# Patient Record
Sex: FEMALE | Race: BLACK OR AFRICAN AMERICAN | NOT HISPANIC OR LATINO | Employment: FULL TIME | URBAN - METROPOLITAN AREA
[De-identification: names, ages, dates, MRNs, and addresses within clinical notes are randomized per-mention and may not be internally consistent; named-entity substitution may affect disease eponyms.]

---

## 2023-02-22 ENCOUNTER — APPOINTMENT (OUTPATIENT)
Dept: ULTRASOUND IMAGING | Facility: HOSPITAL | Age: 23
End: 2023-02-22
Payer: COMMERCIAL

## 2023-02-22 ENCOUNTER — HOSPITAL ENCOUNTER (EMERGENCY)
Facility: HOSPITAL | Age: 23
Discharge: HOME | End: 2023-02-22
Attending: EMERGENCY MEDICINE | Admitting: EMERGENCY MEDICINE
Payer: COMMERCIAL

## 2023-02-22 VITALS
RESPIRATION RATE: 14 BRPM | DIASTOLIC BLOOD PRESSURE: 74 MMHG | TEMPERATURE: 97.3 F | HEART RATE: 74 BPM | SYSTOLIC BLOOD PRESSURE: 106 MMHG | OXYGEN SATURATION: 97 %

## 2023-02-22 DIAGNOSIS — O03.9 MISCARRIAGE: Primary | ICD-10-CM

## 2023-02-22 LAB
ABO GROUP BLD: NORMAL
ALBUMIN SERPL-MCNC: 4.3 G/DL (ref 3.5–5.2)
ALBUMIN/GLOB SERPL: 1.4 G/DL
ALP SERPL-CCNC: 38 U/L (ref 39–117)
ALT SERPL W P-5'-P-CCNC: 11 U/L (ref 1–33)
ANION GAP SERPL CALCULATED.3IONS-SCNC: 5.4 MMOL/L (ref 5–15)
AST SERPL-CCNC: 14 U/L (ref 1–32)
BASOPHILS # BLD AUTO: 0.07 10*3/MM3 (ref 0–0.2)
BASOPHILS NFR BLD AUTO: 1.4 % (ref 0–1.5)
BILIRUB SERPL-MCNC: 0.2 MG/DL (ref 0–1.2)
BLD GP AB SCN SERPL QL: NEGATIVE
BUN SERPL-MCNC: 7 MG/DL (ref 6–20)
BUN/CREAT SERPL: 9.9 (ref 7–25)
CALCIUM SPEC-SCNC: 9.1 MG/DL (ref 8.6–10.5)
CHLORIDE SERPL-SCNC: 106 MMOL/L (ref 98–107)
CO2 SERPL-SCNC: 26.6 MMOL/L (ref 22–29)
CREAT SERPL-MCNC: 0.71 MG/DL (ref 0.57–1)
DEPRECATED RDW RBC AUTO: 44.9 FL (ref 37–54)
EGFRCR SERPLBLD CKD-EPI 2021: 123.5 ML/MIN/1.73
EOSINOPHIL # BLD AUTO: 0.1 10*3/MM3 (ref 0–0.4)
EOSINOPHIL NFR BLD AUTO: 1.9 % (ref 0.3–6.2)
ERYTHROCYTE [DISTWIDTH] IN BLOOD BY AUTOMATED COUNT: 14 % (ref 12.3–15.4)
GLOBULIN UR ELPH-MCNC: 3 GM/DL
GLUCOSE SERPL-MCNC: 81 MG/DL (ref 65–99)
HCG INTACT+B SERPL-ACNC: 7.31 MIU/ML
HCT VFR BLD AUTO: 42.4 % (ref 34–46.6)
HGB BLD-MCNC: 13.3 G/DL (ref 12–15.9)
IMM GRANULOCYTES # BLD AUTO: 0.01 10*3/MM3 (ref 0–0.05)
IMM GRANULOCYTES NFR BLD AUTO: 0.2 % (ref 0–0.5)
LYMPHOCYTES # BLD AUTO: 2.29 10*3/MM3 (ref 0.7–3.1)
LYMPHOCYTES NFR BLD AUTO: 44.2 % (ref 19.6–45.3)
MCH RBC QN AUTO: 27.1 PG (ref 26.6–33)
MCHC RBC AUTO-ENTMCNC: 31.4 G/DL (ref 31.5–35.7)
MCV RBC AUTO: 86.5 FL (ref 79–97)
MONOCYTES # BLD AUTO: 0.37 10*3/MM3 (ref 0.1–0.9)
MONOCYTES NFR BLD AUTO: 7.1 % (ref 5–12)
NEUTROPHILS NFR BLD AUTO: 2.34 10*3/MM3 (ref 1.7–7)
NEUTROPHILS NFR BLD AUTO: 45.2 % (ref 42.7–76)
NRBC BLD AUTO-RTO: 0 /100 WBC (ref 0–0.2)
PLATELET # BLD AUTO: 366 10*3/MM3 (ref 140–450)
PMV BLD AUTO: 9.9 FL (ref 6–12)
POTASSIUM SERPL-SCNC: 4.6 MMOL/L (ref 3.5–5.2)
PROT SERPL-MCNC: 7.3 G/DL (ref 6–8.5)
RBC # BLD AUTO: 4.9 10*6/MM3 (ref 3.77–5.28)
RH BLD: POSITIVE
SODIUM SERPL-SCNC: 138 MMOL/L (ref 136–145)
T&S EXPIRATION DATE: NORMAL
WBC NRBC COR # BLD: 5.18 10*3/MM3 (ref 3.4–10.8)

## 2023-02-22 PROCEDURE — 86901 BLOOD TYPING SEROLOGIC RH(D): CPT | Performed by: EMERGENCY MEDICINE

## 2023-02-22 PROCEDURE — 80053 COMPREHEN METABOLIC PANEL: CPT | Performed by: EMERGENCY MEDICINE

## 2023-02-22 PROCEDURE — 36415 COLL VENOUS BLD VENIPUNCTURE: CPT

## 2023-02-22 PROCEDURE — 85025 COMPLETE CBC W/AUTO DIFF WBC: CPT | Performed by: EMERGENCY MEDICINE

## 2023-02-22 PROCEDURE — 76815 OB US LIMITED FETUS(S): CPT

## 2023-02-22 PROCEDURE — 93976 VASCULAR STUDY: CPT

## 2023-02-22 PROCEDURE — 76817 TRANSVAGINAL US OBSTETRIC: CPT

## 2023-02-22 PROCEDURE — 86850 RBC ANTIBODY SCREEN: CPT | Performed by: EMERGENCY MEDICINE

## 2023-02-22 PROCEDURE — 86900 BLOOD TYPING SEROLOGIC ABO: CPT | Performed by: EMERGENCY MEDICINE

## 2023-02-22 PROCEDURE — 99283 EMERGENCY DEPT VISIT LOW MDM: CPT

## 2023-02-22 PROCEDURE — 84702 CHORIONIC GONADOTROPIN TEST: CPT | Performed by: EMERGENCY MEDICINE

## 2023-02-22 RX ORDER — SODIUM CHLORIDE 0.9 % (FLUSH) 0.9 %
10 SYRINGE (ML) INJECTION AS NEEDED
Status: DISCONTINUED | OUTPATIENT
Start: 2023-02-22 | End: 2023-02-22 | Stop reason: HOSPADM

## 2023-02-22 NOTE — ED TRIAGE NOTES
Patient reports abd cramping and bleeding states she is about 5 weeks preg    Patient wearing mask this RN in PPE

## 2023-02-22 NOTE — ED PROVIDER NOTES
" EMERGENCY DEPARTMENT ENCOUNTER    Room Number:  10/10  Date seen:  2/22/2023  PCP: Provider, No Known  Historian: Patient      HPI:  Chief Complaint: Pregnancy concerns  A complete HPI/ROS/PMH/PSH/SH/FH are unobtainable due to:   Context: Becki Rosenthal is a 23 y.o. female who presents to the ED c/o possible miscarriage.  Patient states that she is pregnant and somewhere between 5 and 8 weeks pregnant.  She states that she had an obstetrical ultrasound on 2/10 that did show either fetal heartbeat or decidual sac.  She states her last menstrual period was 17 January.  She has been having left-sided abdominal cramping ongoing for several weeks.  The cramping worsened over the last couple of days and she is worried about a \"miscarriage\".  Cramping is currently mild.  It is sharp in the left lower abdomen.  She denies vaginal bleeding.      MEDICAL RECORD REVIEW (non ED)  I did review prior medical records but was unable to access recent visit at [facility] despite query through care everywhere.  I do see visit in August for routine general medical examination which is unremarkable.    PAST MEDICAL HISTORY  Active Ambulatory Problems     Diagnosis Date Noted   • No Active Ambulatory Problems     Resolved Ambulatory Problems     Diagnosis Date Noted   • No Resolved Ambulatory Problems     No Additional Past Medical History         PAST SURGICAL HISTORY  History reviewed. No pertinent surgical history.      FAMILY HISTORY  History reviewed. No pertinent family history.      SOCIAL HISTORY  Social History     Socioeconomic History   • Marital status: Single         ALLERGIES  Patient has no known allergies.        REVIEW OF SYSTEMS  Review of Systems   Constitutional: Negative for fever.   Respiratory: Negative for shortness of breath.    Cardiovascular: Negative for chest pain.   Genitourinary: Positive for pelvic pain.            PHYSICAL EXAM  ED Triage Vitals   Temp Heart Rate Resp BP SpO2   02/22/23 1112 02/22/23 1112 " 02/22/23 1121 -- 02/22/23 1112   97.3 °F (36.3 °C) 80 16  99 %      Temp src Heart Rate Source Patient Position BP Location FiO2 (%)   02/22/23 1112 02/22/23 1112 -- -- --   Tympanic Monitor          Physical Exam    GENERAL: Alert female no obvious distress.  Triage vitals reviewed and are benign  HENT: nares patent  EYES: no scleral icterus  CV: regular rhythm, regular rate  RESPIRATORY: normal effort, clear to auscultation bilateral  ABDOMEN: soft, nontender to palpation  MUSCULOSKELETAL: no deformity  NEURO: Strength sensation and coordination are grossly intact.  Speech and mentation are unremarkable  SKIN: warm, dry      Vital signs and nursing notes reviewed.          LAB RESULTS  Recent Results (from the past 24 hour(s))   hCG, Quantitative, Pregnancy    Collection Time: 02/22/23 12:52 PM    Specimen: Blood   Result Value Ref Range    HCG Quantitative 7.31 mIU/mL   Comprehensive Metabolic Panel    Collection Time: 02/22/23 12:53 PM    Specimen: Blood   Result Value Ref Range    Glucose 81 65 - 99 mg/dL    BUN 7 6 - 20 mg/dL    Creatinine 0.71 0.57 - 1.00 mg/dL    Sodium 138 136 - 145 mmol/L    Potassium 4.6 3.5 - 5.2 mmol/L    Chloride 106 98 - 107 mmol/L    CO2 26.6 22.0 - 29.0 mmol/L    Calcium 9.1 8.6 - 10.5 mg/dL    Total Protein 7.3 6.0 - 8.5 g/dL    Albumin 4.3 3.5 - 5.2 g/dL    ALT (SGPT) 11 1 - 33 U/L    AST (SGOT) 14 1 - 32 U/L    Alkaline Phosphatase 38 (L) 39 - 117 U/L    Total Bilirubin 0.2 0.0 - 1.2 mg/dL    Globulin 3.0 gm/dL    A/G Ratio 1.4 g/dL    BUN/Creatinine Ratio 9.9 7.0 - 25.0    Anion Gap 5.4 5.0 - 15.0 mmol/L    eGFR 123.5 >60.0 mL/min/1.73   CBC Auto Differential    Collection Time: 02/22/23 12:53 PM    Specimen: Blood   Result Value Ref Range    WBC 5.18 3.40 - 10.80 10*3/mm3    RBC 4.90 3.77 - 5.28 10*6/mm3    Hemoglobin 13.3 12.0 - 15.9 g/dL    Hematocrit 42.4 34.0 - 46.6 %    MCV 86.5 79.0 - 97.0 fL    MCH 27.1 26.6 - 33.0 pg    MCHC 31.4 (L) 31.5 - 35.7 g/dL    RDW 14.0 12.3 -  15.4 %    RDW-SD 44.9 37.0 - 54.0 fl    MPV 9.9 6.0 - 12.0 fL    Platelets 366 140 - 450 10*3/mm3    Neutrophil % 45.2 42.7 - 76.0 %    Lymphocyte % 44.2 19.6 - 45.3 %    Monocyte % 7.1 5.0 - 12.0 %    Eosinophil % 1.9 0.3 - 6.2 %    Basophil % 1.4 0.0 - 1.5 %    Immature Grans % 0.2 0.0 - 0.5 %    Neutrophils, Absolute 2.34 1.70 - 7.00 10*3/mm3    Lymphocytes, Absolute 2.29 0.70 - 3.10 10*3/mm3    Monocytes, Absolute 0.37 0.10 - 0.90 10*3/mm3    Eosinophils, Absolute 0.10 0.00 - 0.40 10*3/mm3    Basophils, Absolute 0.07 0.00 - 0.20 10*3/mm3    Immature Grans, Absolute 0.01 0.00 - 0.05 10*3/mm3    nRBC 0.0 0.0 - 0.2 /100 WBC   Type & Screen    Collection Time: 02/22/23 12:54 PM    Specimen: Blood   Result Value Ref Range    ABO Type A     RH type Positive     Antibody Screen Negative     T&S Expiration Date 2/25/2023 11:59:59 PM        Ordered the above labs and independently interpreted results. My findings will be discussed in the medical decision making section below        RADIOLOGY  US Ob Limited 1 + Fetuses, US Ob Transvaginal, US Testicular or Ovarian Vascular Limited    Result Date: 2/22/2023  PROCEDURE: US OB LIMITED 1 + FETUSES-, US TESTICULAR OR OVARIAN VASCULAR LIMITED-, US OB TRANSVAGINAL-  HISTORY: Left cramping, bleeding for 3 days. Quantitative beta-hCG 7.3.  TECHNIQUE: Transabdominal and transvaginal ultrasound of the pelvis was performed.  COMPARISON: None available.  FINDINGS:  MEASUREMENTS: Uterus: 8.0 x 4.5 x 6.4 cm Endometrial stripe: 0.6 cm Right ovary: 2.9 x 2.0 x 1.5 cm Left ovary: 2.8 x 1.2 x 1.6 cm  UTERUS: The uterus is normal in size for patient age. The endometrial thickness is within normal limits for patient age. No intrauterine gestational sac is visualized. OVARIES: The ovaries are normal in size. Arterial and venous waveforms are identified within both ovaries. There is a 1.7 cm septated right ovarian cyst/follicle, likely physiologic. FREE FLUID: There is small volume free pelvic  fluid.       No intrauterine gestational sac is visualized. Correlate with serial beta hCG with repeat ultrasound as clinically directed. Small volume free pelvic fluid, likely physiologic in a patient of this age.  This report was finalized on 2/22/2023 3:29 PM by Dr. Jazmine Mckenzie M.D.        I ordered and independently reviewed the above noted radiographic studies.      I viewed images of ultrasound which showed empty uterus per my independent interpretation.    See radiologist's dictation for official interpretation.             PROCEDURES  Procedures          MEDICATIONS GIVEN IN ER  Medications   sodium chloride 0.9 % flush 10 mL (has no administration in time range)               MEDICAL DECISION MAKING, PROGRESS, and CONSULTS    All labs have been independently reviewed by me.  All radiology studies have been reviewed by me and I have also reviewed the radiology report.   EKG's independently viewed and interpreted by me.  Discussion below represents my analysis of pertinent findings related to patient's condition, differential diagnosis, treatment plan and final disposition.      Additional sources:  - Discussed/ obtained information from independent historians:      - External (non-ED) record review: Please see documented above    - Chronic or social conditions impacting care:     - Shared decision making: I discussed ED evaluation and treatment plan with patient who is in agreement.  Discussed results of hCG quantitative which was extraordinarily low and ultrasound which showed no evidence of intrauterine pregnancy.  Patient understands she is to follow-up with her [facility] provider as outpatient      Orders placed during this visit:  Orders Placed This Encounter   Procedures   • US Ob Limited 1 + Fetuses   • US Ob Transvaginal   • US Testicular or Ovarian Vascular Limited   • Comprehensive Metabolic Panel   • hCG, Quantitative, Pregnancy   • CBC Auto Differential   • Type & Screen   • Insert Peripheral IV    • CBC & Differential           Differential diagnosis:    Please see as documented below in ED course      Independent interpretation of labs, radiology studies, and discussions with consultants:  ED Course as of 02/22/23 1544   Wed Feb 22, 2023   1219 According to last menstrual period of 1/17 patient would be roughly 5 weeks pregnant today. [DB]   1221 [id number] female G2 P P0 at about 5 weeks by dates presents with left-sided abdominal cramping and concern for possible miscarriage    Exam is relatively benign without tachycardia or significant tenderness to palpation    Certainly would be concerned about miscarriage versus normal pregnancy versus ectopic pregnancy.  Patient states she did have ultrasound on 10 February which did show either decidual sac or even heartbeat which would put her at greater than 5 weeks today possibly.  We will get hCG quant labs and ultrasound for further assessment [DB]   1417 Labs are reviewed and notable for normal CBC.  Chemistries benign.  Rh factor is positive.    Unfortunately hCG level is quite low at 7.3.  This would go against likely blighted ovum and probable miscarriage.  Expect that ultrasound may not show signs of pregnancy but ultrasound is pending. [DB]   1539 Pelvic ultrasound which was independently reviewed and discussed with radiologist shows no evidence to suggest intrauterine pregnancy.  There is an empty gestational there is no noted gestational sac.    Given patient's prior history with reported IUP on 12/10 would suspect likely completed miscarriage.  We will still recommend follow-up with her OB/GYN later this week or early next week.  Patient can take Tylenol or Motrin to help with pain or cramping. [DB]      ED Course User Index  [DB] Leoncio Brooks MD             I used full protective equipment while examining this patient.  This includes face mask, gloves and protective eyewear.  I washed my hands before entering the room and immediately upon  leaving the room    DIAGNOSIS  Final diagnoses:   Miscarriage         DISPOSITION  DISCHARGE    Patient discharged in stable condition.    Reviewed implications of results, diagnosis, meds, responsibility to follow up, warning signs and symptoms of possible worsening, potential complications and reasons to return to ER, including increased pain, fever or as needed.    Patient/Family voiced understanding of above instructions.    Discussed plan for discharge, as there is no emergent indication for admission. Patient referred to primary care provider for BP management due to today's BP. Pt/family is agreeable and understands need for follow up and repeat testing.  Pt is aware that discharge does not mean that nothing is wrong but it indicates no emergency is present that requires admission and they must continue care with follow-up as given below or physician of their choice.     FOLLOW-UP  Kenna Llamas, APRN  [address] [id number] [phone]      Call for Appointment         Medication List      No changes were made to your prescriptions during this visit.                   Latest Documented Vital Signs:  As of 15:44 EST  BP- 106/77 HR- 80 Temp- 97.3 °F (36.3 °C) (Tympanic) O2 sat- 98%              --    Please note that portions of this were completed with a voice recognition program.       Note Disclaimer: At [facility], we believe that sharing information builds trust and better relationships. You are receiving this note because you are receiving care at [facility] or recently visited. It is possible you will see health information before a provider has talked with you about it. This kind of information can be easy to misunderstand. To help you fully understand what it means for your health, we urge you to discuss this note with your provider.           Leoncio Brooks MD  02/22/23 8225